# Patient Record
Sex: FEMALE
[De-identification: names, ages, dates, MRNs, and addresses within clinical notes are randomized per-mention and may not be internally consistent; named-entity substitution may affect disease eponyms.]

---

## 2018-02-11 PROBLEM — Z00.00 ENCOUNTER FOR PREVENTIVE HEALTH EXAMINATION: Status: ACTIVE | Noted: 2018-02-11

## 2018-03-13 ENCOUNTER — APPOINTMENT (OUTPATIENT)
Dept: ANTEPARTUM | Facility: CLINIC | Age: 33
End: 2018-03-13
Payer: COMMERCIAL

## 2018-03-13 ENCOUNTER — ASOB RESULT (OUTPATIENT)
Age: 33
End: 2018-03-13

## 2018-03-13 PROCEDURE — 76811 OB US DETAILED SNGL FETUS: CPT

## 2018-04-07 ENCOUNTER — TRANSCRIPTION ENCOUNTER (OUTPATIENT)
Age: 33
End: 2018-04-07

## 2018-05-22 ENCOUNTER — APPOINTMENT (OUTPATIENT)
Dept: ANTEPARTUM | Facility: CLINIC | Age: 33
End: 2018-05-22

## 2018-05-22 ENCOUNTER — ASOB RESULT (OUTPATIENT)
Age: 33
End: 2018-05-22

## 2018-05-22 ENCOUNTER — APPOINTMENT (OUTPATIENT)
Dept: ANTEPARTUM | Facility: CLINIC | Age: 33
End: 2018-05-22
Payer: COMMERCIAL

## 2018-05-22 PROCEDURE — 76819 FETAL BIOPHYS PROFIL W/O NST: CPT

## 2018-05-22 PROCEDURE — 99241 OFFICE CONSULTATION NEW/ESTAB PATIENT 15 MIN: CPT | Mod: 25

## 2018-05-22 PROCEDURE — 76816 OB US FOLLOW-UP PER FETUS: CPT

## 2018-07-06 ENCOUNTER — INPATIENT (INPATIENT)
Facility: HOSPITAL | Age: 33
LOS: 2 days | Discharge: HOME | End: 2018-07-09
Attending: OBSTETRICS & GYNECOLOGY | Admitting: OBSTETRICS & GYNECOLOGY

## 2018-07-06 ENCOUNTER — TRANSCRIPTION ENCOUNTER (OUTPATIENT)
Age: 33
End: 2018-07-06

## 2018-07-06 VITALS
TEMPERATURE: 98 F | DIASTOLIC BLOOD PRESSURE: 77 MMHG | RESPIRATION RATE: 17 BRPM | HEART RATE: 96 BPM | SYSTOLIC BLOOD PRESSURE: 132 MMHG | OXYGEN SATURATION: 97 %

## 2018-07-06 DIAGNOSIS — O26.899 OTHER SPECIFIED PREGNANCY RELATED CONDITIONS, UNSPECIFIED TRIMESTER: ICD-10-CM

## 2018-07-06 DIAGNOSIS — Z3A.00 WEEKS OF GESTATION OF PREGNANCY NOT SPECIFIED: ICD-10-CM

## 2018-07-06 LAB
BASOPHILS # BLD AUTO: 0.04 K/UL — SIGNIFICANT CHANGE UP (ref 0–0.2)
BASOPHILS NFR BLD AUTO: 0.3 % — SIGNIFICANT CHANGE UP (ref 0–2)
BLD GP AB SCN SERPL QL: NEGATIVE — SIGNIFICANT CHANGE UP
EOSINOPHIL # BLD AUTO: 0.08 K/UL — SIGNIFICANT CHANGE UP (ref 0–0.5)
EOSINOPHIL NFR BLD AUTO: 0.6 % — SIGNIFICANT CHANGE UP (ref 0–6)
HCT VFR BLD CALC: 48.2 % — HIGH (ref 34.5–45)
HGB BLD-MCNC: 15.5 G/DL — SIGNIFICANT CHANGE UP (ref 11.5–15.5)
IMM GRANULOCYTES # BLD AUTO: 0.09 # — SIGNIFICANT CHANGE UP
IMM GRANULOCYTES NFR BLD AUTO: 0.7 % — SIGNIFICANT CHANGE UP (ref 0–1.5)
LYMPHOCYTES # BLD AUTO: 15.4 % — SIGNIFICANT CHANGE UP (ref 13–44)
LYMPHOCYTES # BLD AUTO: 2.12 K/UL — SIGNIFICANT CHANGE UP (ref 1–3.3)
MCHC RBC-ENTMCNC: 27.9 PG — SIGNIFICANT CHANGE UP (ref 27–34)
MCHC RBC-ENTMCNC: 32.2 % — SIGNIFICANT CHANGE UP (ref 32–36)
MCV RBC AUTO: 86.7 FL — SIGNIFICANT CHANGE UP (ref 80–100)
MONOCYTES # BLD AUTO: 0.99 K/UL — HIGH (ref 0–0.9)
MONOCYTES NFR BLD AUTO: 7.2 % — SIGNIFICANT CHANGE UP (ref 2–14)
NEUTROPHILS # BLD AUTO: 10.44 K/UL — HIGH (ref 1.8–7.4)
NEUTROPHILS NFR BLD AUTO: 75.8 % — SIGNIFICANT CHANGE UP (ref 43–77)
NRBC # FLD: 0 — SIGNIFICANT CHANGE UP
PLATELET # BLD AUTO: 243 K/UL — SIGNIFICANT CHANGE UP (ref 150–400)
PMV BLD: 11.1 FL — SIGNIFICANT CHANGE UP (ref 7–13)
RBC # BLD: 5.56 M/UL — HIGH (ref 3.8–5.2)
RBC # FLD: 13.7 % — SIGNIFICANT CHANGE UP (ref 10.3–14.5)
RH IG SCN BLD-IMP: POSITIVE — SIGNIFICANT CHANGE UP
WBC # BLD: 13.76 K/UL — HIGH (ref 3.8–10.5)
WBC # FLD AUTO: 13.76 K/UL — HIGH (ref 3.8–10.5)

## 2018-07-06 RX ORDER — SODIUM CHLORIDE 9 MG/ML
1000 INJECTION, SOLUTION INTRAVENOUS
Qty: 0 | Refills: 0 | Status: DISCONTINUED | OUTPATIENT
Start: 2018-07-06 | End: 2018-07-07

## 2018-07-06 RX ORDER — SODIUM CHLORIDE 9 MG/ML
1000 INJECTION, SOLUTION INTRAVENOUS
Qty: 0 | Refills: 0 | Status: DISCONTINUED | OUTPATIENT
Start: 2018-07-06 | End: 2018-07-06

## 2018-07-06 RX ORDER — OXYTOCIN 10 UNIT/ML
333.33 VIAL (ML) INJECTION
Qty: 20 | Refills: 0 | Status: DISCONTINUED | OUTPATIENT
Start: 2018-07-06 | End: 2018-07-06

## 2018-07-06 RX ADMIN — SODIUM CHLORIDE 250 MILLILITER(S): 9 INJECTION, SOLUTION INTRAVENOUS at 12:43

## 2018-07-06 NOTE — PATIENT PROFILE OB - PROVIDER NOTIFICATION
----- Message from Sheela Savage sent at 4/2/2018 12:16 PM CDT -----  Contact: pt  Pt need a refill on her Losartan 25 mg send to [facility] and the pt says she is completely out.  LOV 9/29/17 Dr. augustin    Thanks  
Declines

## 2018-07-07 LAB
RH IG SCN BLD-IMP: POSITIVE — SIGNIFICANT CHANGE UP
T PALLIDUM AB TITR SER: NEGATIVE — SIGNIFICANT CHANGE UP

## 2018-07-07 RX ORDER — PRAMOXINE HYDROCHLORIDE 150 MG/15G
1 AEROSOL, FOAM RECTAL EVERY 4 HOURS
Qty: 0 | Refills: 0 | Status: DISCONTINUED | OUTPATIENT
Start: 2018-07-07 | End: 2018-07-09

## 2018-07-07 RX ORDER — SODIUM CHLORIDE 9 MG/ML
3 INJECTION INTRAMUSCULAR; INTRAVENOUS; SUBCUTANEOUS EVERY 8 HOURS
Qty: 0 | Refills: 0 | Status: DISCONTINUED | OUTPATIENT
Start: 2018-07-07 | End: 2018-07-09

## 2018-07-07 RX ORDER — SIMETHICONE 80 MG/1
80 TABLET, CHEWABLE ORAL EVERY 6 HOURS
Qty: 0 | Refills: 0 | Status: DISCONTINUED | OUTPATIENT
Start: 2018-07-07 | End: 2018-07-09

## 2018-07-07 RX ORDER — DIBUCAINE 1 %
1 OINTMENT (GRAM) RECTAL EVERY 4 HOURS
Qty: 0 | Refills: 0 | Status: DISCONTINUED | OUTPATIENT
Start: 2018-07-07 | End: 2018-07-09

## 2018-07-07 RX ORDER — KETOROLAC TROMETHAMINE 30 MG/ML
30 SYRINGE (ML) INJECTION ONCE
Qty: 0 | Refills: 0 | Status: DISCONTINUED | OUTPATIENT
Start: 2018-07-07 | End: 2018-07-07

## 2018-07-07 RX ORDER — PRAMOXINE HYDROCHLORIDE 150 MG/15G
1 AEROSOL, FOAM RECTAL EVERY 4 HOURS
Qty: 0 | Refills: 0 | Status: DISCONTINUED | OUTPATIENT
Start: 2018-07-07 | End: 2018-07-07

## 2018-07-07 RX ORDER — AER TRAVELER 0.5 G/1
1 SOLUTION RECTAL; TOPICAL EVERY 4 HOURS
Qty: 0 | Refills: 0 | Status: DISCONTINUED | OUTPATIENT
Start: 2018-07-07 | End: 2018-07-09

## 2018-07-07 RX ORDER — ACETAMINOPHEN 500 MG
975 TABLET ORAL EVERY 6 HOURS
Qty: 0 | Refills: 0 | Status: DISCONTINUED | OUTPATIENT
Start: 2018-07-07 | End: 2018-07-09

## 2018-07-07 RX ORDER — OXYCODONE HYDROCHLORIDE 5 MG/1
5 TABLET ORAL EVERY 4 HOURS
Qty: 0 | Refills: 0 | Status: DISCONTINUED | OUTPATIENT
Start: 2018-07-07 | End: 2018-07-09

## 2018-07-07 RX ORDER — TETANUS TOXOID, REDUCED DIPHTHERIA TOXOID AND ACELLULAR PERTUSSIS VACCINE, ADSORBED 5; 2.5; 8; 8; 2.5 [IU]/.5ML; [IU]/.5ML; UG/.5ML; UG/.5ML; UG/.5ML
0.5 SUSPENSION INTRAMUSCULAR ONCE
Qty: 0 | Refills: 0 | Status: DISCONTINUED | OUTPATIENT
Start: 2018-07-07 | End: 2018-07-09

## 2018-07-07 RX ORDER — ALBUTEROL 90 UG/1
2 AEROSOL, METERED ORAL EVERY 6 HOURS
Qty: 0 | Refills: 0 | Status: DISCONTINUED | OUTPATIENT
Start: 2018-07-07 | End: 2018-07-09

## 2018-07-07 RX ORDER — OXYCODONE HYDROCHLORIDE 5 MG/1
5 TABLET ORAL
Qty: 0 | Refills: 0 | Status: DISCONTINUED | OUTPATIENT
Start: 2018-07-07 | End: 2018-07-09

## 2018-07-07 RX ORDER — HYDROCORTISONE 1 %
1 OINTMENT (GRAM) TOPICAL EVERY 4 HOURS
Qty: 0 | Refills: 0 | Status: DISCONTINUED | OUTPATIENT
Start: 2018-07-07 | End: 2018-07-09

## 2018-07-07 RX ORDER — OXYCODONE AND ACETAMINOPHEN 5; 325 MG/1; MG/1
2 TABLET ORAL EVERY 6 HOURS
Qty: 0 | Refills: 0 | Status: DISCONTINUED | OUTPATIENT
Start: 2018-07-07 | End: 2018-07-07

## 2018-07-07 RX ORDER — MAGNESIUM HYDROXIDE 400 MG/1
30 TABLET, CHEWABLE ORAL
Qty: 0 | Refills: 0 | Status: DISCONTINUED | OUTPATIENT
Start: 2018-07-07 | End: 2018-07-09

## 2018-07-07 RX ORDER — ACETAMINOPHEN 500 MG
650 TABLET ORAL EVERY 6 HOURS
Qty: 0 | Refills: 0 | Status: DISCONTINUED | OUTPATIENT
Start: 2018-07-07 | End: 2018-07-07

## 2018-07-07 RX ORDER — HYDROCORTISONE 1 %
1 OINTMENT (GRAM) TOPICAL EVERY 4 HOURS
Qty: 0 | Refills: 0 | Status: DISCONTINUED | OUTPATIENT
Start: 2018-07-07 | End: 2018-07-07

## 2018-07-07 RX ORDER — GLYCERIN ADULT
1 SUPPOSITORY, RECTAL RECTAL AT BEDTIME
Qty: 0 | Refills: 0 | Status: DISCONTINUED | OUTPATIENT
Start: 2018-07-07 | End: 2018-07-09

## 2018-07-07 RX ORDER — LANOLIN
1 OINTMENT (GRAM) TOPICAL EVERY 6 HOURS
Qty: 0 | Refills: 0 | Status: DISCONTINUED | OUTPATIENT
Start: 2018-07-07 | End: 2018-07-09

## 2018-07-07 RX ORDER — ERGOCALCIFEROL 1.25 MG/1
0 CAPSULE ORAL
Qty: 0 | Refills: 0 | COMMUNITY

## 2018-07-07 RX ORDER — OXYTOCIN 10 UNIT/ML
41.67 VIAL (ML) INJECTION
Qty: 20 | Refills: 0 | Status: DISCONTINUED | OUTPATIENT
Start: 2018-07-07 | End: 2018-07-09

## 2018-07-07 RX ORDER — IBUPROFEN 200 MG
600 TABLET ORAL EVERY 6 HOURS
Qty: 0 | Refills: 0 | Status: DISCONTINUED | OUTPATIENT
Start: 2018-07-07 | End: 2018-07-09

## 2018-07-07 RX ORDER — DIPHENHYDRAMINE HCL 50 MG
25 CAPSULE ORAL EVERY 6 HOURS
Qty: 0 | Refills: 0 | Status: DISCONTINUED | OUTPATIENT
Start: 2018-07-07 | End: 2018-07-09

## 2018-07-07 RX ORDER — IBUPROFEN 200 MG
600 TABLET ORAL EVERY 6 HOURS
Qty: 0 | Refills: 0 | Status: DISCONTINUED | OUTPATIENT
Start: 2018-07-07 | End: 2018-07-07

## 2018-07-07 RX ORDER — DOCUSATE SODIUM 100 MG
100 CAPSULE ORAL
Qty: 0 | Refills: 0 | Status: DISCONTINUED | OUTPATIENT
Start: 2018-07-07 | End: 2018-07-09

## 2018-07-07 RX ADMIN — SODIUM CHLORIDE 3 MILLILITER(S): 9 INJECTION INTRAMUSCULAR; INTRAVENOUS; SUBCUTANEOUS at 14:23

## 2018-07-07 RX ADMIN — Medication 125 MILLIUNIT(S)/MIN: at 01:39

## 2018-07-07 RX ADMIN — SODIUM CHLORIDE 3 MILLILITER(S): 9 INJECTION INTRAMUSCULAR; INTRAVENOUS; SUBCUTANEOUS at 06:56

## 2018-07-07 NOTE — DISCHARGE NOTE OB - CARE PROVIDER_API CALL
Mayra Tripathi), [address]  [address] [place] [address]  Phone: [phone]  Fax: [phone]

## 2018-07-07 NOTE — DISCHARGE NOTE OB - CARE PLAN
Principal Discharge DX:	 (spontaneous vaginal delivery)  Goal:	Routine recovery  Assessment and plan of treatment:	Routine postop care

## 2018-07-07 NOTE — DISCHARGE NOTE OB - PATIENT PORTAL LINK FT
You can access the [facility] Patient Portal, offered by [facility], by registering with the following website: [email or web address]

## 2018-07-08 RX ADMIN — Medication 975 MILLIGRAM(S): at 23:44

## 2018-07-08 RX ADMIN — Medication 1 TABLET(S): at 08:07

## 2018-07-08 RX ADMIN — Medication 975 MILLIGRAM(S): at 08:56

## 2018-07-08 RX ADMIN — SODIUM CHLORIDE 3 MILLILITER(S): 9 INJECTION INTRAMUSCULAR; INTRAVENOUS; SUBCUTANEOUS at 02:52

## 2018-07-08 RX ADMIN — Medication 975 MILLIGRAM(S): at 08:07

## 2018-07-08 NOTE — PROGRESS NOTE ADULT - SUBJECTIVE AND OBJECTIVE BOX
INTERVAL HPI/OVERNIGHT EVENTS: Pt seen and examined at bedside.  Doing well, mild uterine cramping. +voiding without difficulty, +ambulation, roger reg diet.    MEDICATIONS  (STANDING):  acetaminophen   Tablet. 975 milliGRAM(s) Oral every 6 hours  diphtheria/tetanus/pertussis (acellular) Vaccine (ADAcel) 0.5 milliLiter(s) IntraMuscular once  ibuprofen  Tablet 600 milliGRAM(s) Oral every 6 hours  oxyCODONE    IR 5 milliGRAM(s) Oral every 3 hours  oxytocin Infusion 41.667 milliUNIT(s)/Min (125 mL/Hr) IV Continuous <Continuous>  prenatal multivitamin 1 Tablet(s) Oral daily  sodium chloride 0.9% lock flush 3 milliLiter(s) IV Push every 8 hours    MEDICATIONS  (PRN):  ALBUTerol    90 MICROgram(s) HFA Inhaler 2 Puff(s) Inhalation every 6 hours PRN Shortness of Breath and/or Wheezing  dibucaine 1% Ointment 1 Application(s) Topical every 4 hours PRN Perineal Discomfort  diphenhydrAMINE   Capsule 25 milliGRAM(s) Oral every 6 hours PRN Itching  docusate sodium 100 milliGRAM(s) Oral two times a day PRN Stool Softening  glycerin Suppository - Adult 1 Suppository(s) Rectal at bedtime PRN Constipation  hydrocortisone 1% Cream 1 Application(s) Topical every 4 hours PRN perineal discomfort  lanolin Ointment 1 Application(s) Topical every 6 hours PRN Sore Nipples  magnesium hydroxide Suspension 30 milliLiter(s) Oral two times a day PRN Constipation  oxyCODONE    IR 5 milliGRAM(s) Oral every 4 hours PRN Severe Pain (7 -10)  pramoxine 1%/zinc 5% Cream 1 Application(s) Topical every 4 hours PRN perineal discomfort  simethicone 80 milliGRAM(s) Chew every 6 hours PRN Gas  witch hazel Pads 1 Application(s) Topical every 4 hours PRN Perineal Discomfort      Vital Signs Last 24 Hrs  T(C): 37 (08 Jul 2018 17:19), Max: 37 (08 Jul 2018 17:19)  T(F): 98.6 (08 Jul 2018 17:19), Max: 98.6 (08 Jul 2018 17:19)  HR: 97 (08 Jul 2018 17:19) (75 - 97)  BP: 124/77 (08 Jul 2018 17:19) (115/65 - 124/77)  BP(mean): --  RR: 18 (08 Jul 2018 17:19) (18 - 19)  SpO2: 100% (08 Jul 2018 17:19) (98% - 100%)    PHYSICAL EXAM:    GA: NAD, A+0 x 3  Abd: ( + ) BS, soft, nontender, nondistended, no rebound or guarding,   Uterus: Fundus firm    LABS:                  RADIOLOGY & ADDITIONAL TESTS:

## 2018-07-08 NOTE — PROVIDER CONTACT NOTE (OTHER) - ACTION/TREATMENT ORDERED:
Patient urinated. Fundus reassessed after toileting, no change. Lochia light. Patient educated to alert staff if increased perineal bleeding noted or if perineal blood clots noted.

## 2018-07-08 NOTE — PROGRESS NOTE ADULT - SUBJECTIVE AND OBJECTIVE BOX
Anesthesia Post-op Note    POD#1 S/P labor analgesia    Patient is doing well.  OOBAA. Tolerating clears.  Pain is tolerable.  No residual anesthetic issues or complications noted.    Vital Signs Last 24 Hrs  T(C): 36.6 (08 Jul 2018 05:59), Max: 36.7 (07 Jul 2018 20:02)  T(F): 97.9 (08 Jul 2018 05:59), Max: 98.1 (07 Jul 2018 20:02)  HR: 75 (08 Jul 2018 05:59) (75 - 82)  BP: 115/65 (08 Jul 2018 05:59) (115/65 - 125/73)  BP(mean): --  RR: 19 (08 Jul 2018 05:59) (19 - 19)  SpO2: 98% (08 Jul 2018 05:59) (98% - 100%)

## 2018-07-09 VITALS
SYSTOLIC BLOOD PRESSURE: 121 MMHG | OXYGEN SATURATION: 100 % | HEART RATE: 72 BPM | RESPIRATION RATE: 18 BRPM | DIASTOLIC BLOOD PRESSURE: 81 MMHG | TEMPERATURE: 98 F

## 2018-07-09 RX ORDER — IBUPROFEN 200 MG
1 TABLET ORAL
Qty: 0 | Refills: 0 | COMMUNITY
Start: 2018-07-09

## 2018-07-09 RX ORDER — ACETAMINOPHEN 500 MG
3 TABLET ORAL
Qty: 0 | Refills: 0 | COMMUNITY
Start: 2018-07-09

## 2018-07-09 RX ADMIN — Medication 975 MILLIGRAM(S): at 06:16

## 2018-07-09 RX ADMIN — Medication 975 MILLIGRAM(S): at 00:30

## 2018-07-13 LAB — GLUCOSE BLDC GLUCOMTR-MCNC: 290 MG/DL — HIGH (ref 70–99)

## 2019-10-25 NOTE — PATIENT PROFILE OB - POST PARTUM DEPRESSION SCREEN OB 4
Phone Numbers: [phone] (H)  Preferred Language:  English [1]    [email or web address]    Payor: [employer] MEDICARE COMPLETE / Plan: MEDICARE COMPLETE HMO / Product Type: MEDADV   Specialty Referral Req'd?:  N/A  Secondary?   No  Work Comp?:  No    Blayne Thomason MD    [facility] Internal Medicine  Phone: [phone]  Fax: [phone]    Estimated body mass index is 28.46 kg/m² as calculated from the following:    Height as of 10/24/19: 6' (1.829 m).    Weight as of 10/24/19: 95.2 kg.    Scheduling at [facility]?:  YES - CHECK BMI Limit BMI OK  and  Confirm if Pacemaker...No - OK to sched at [facility]    PreOp Diagnoses:    Oth tear of medial meniscus, current injury, left knee, subs           no

## 2022-08-03 NOTE — PATIENT PROFILE OB - FALL HARM RISK CONCLUSION
Writer called Any to discuss. Pt states that she been having severe anxiety and chest pain. She has gone to the ER twice and they tell her she's having panic attacks. She reports that they ruled out heart attack or other medical causes. Her last ER visit was Sunday, July 24th. Pt states, \"I keep having these attacks, I'm taking Geodon but I don't know if I want to increase it.\" Pt would not elaborate as to why she was hesitant to increase, stated \"I don't know\" when writer asked. Pt also states, \" I'm having Bipolar issues too, insomnia, racing thoughts, anxiety, chest pain, sudden urge to just go go go. I will have tons of energy then I'll crash and pass out. When I take the anxiety medication Coco gives me it knocks me out when I take it for a panic attack.\" Pt states she cannot take Hydroxyzine during the day for a panic attack because it is too sedating and effects her ability to work. Routed to provider.    Universal Safety Interventions

## 2024-08-29 ENCOUNTER — NON-APPOINTMENT (OUTPATIENT)
Age: 39
End: 2024-08-29

## 2024-08-30 ENCOUNTER — LABORATORY RESULT (OUTPATIENT)
Age: 39
End: 2024-08-30

## 2024-08-30 ENCOUNTER — APPOINTMENT (OUTPATIENT)
Dept: GASTROENTEROLOGY | Facility: CLINIC | Age: 39
End: 2024-08-30
Payer: COMMERCIAL

## 2024-08-30 VITALS
WEIGHT: 164 LBS | SYSTOLIC BLOOD PRESSURE: 126 MMHG | TEMPERATURE: 98.2 F | RESPIRATION RATE: 14 BRPM | HEART RATE: 89 BPM | BODY MASS INDEX: 32.2 KG/M2 | HEIGHT: 60 IN | DIASTOLIC BLOOD PRESSURE: 80 MMHG | OXYGEN SATURATION: 99 %

## 2024-08-30 DIAGNOSIS — K76.0 FATTY (CHANGE OF) LIVER, NOT ELSEWHERE CLASSIFIED: ICD-10-CM

## 2024-08-30 DIAGNOSIS — Z78.9 OTHER SPECIFIED HEALTH STATUS: ICD-10-CM

## 2024-08-30 DIAGNOSIS — R74.8 ABNORMAL LEVELS OF OTHER SERUM ENZYMES: ICD-10-CM

## 2024-08-30 DIAGNOSIS — Z86.19 PERSONAL HISTORY OF OTHER INFECTIOUS AND PARASITIC DISEASES: ICD-10-CM

## 2024-08-30 DIAGNOSIS — E66.3 OVERWEIGHT: ICD-10-CM

## 2024-08-30 PROCEDURE — 36415 COLL VENOUS BLD VENIPUNCTURE: CPT

## 2024-08-30 PROCEDURE — 99204 OFFICE O/P NEW MOD 45 MIN: CPT

## 2024-08-30 RX ORDER — NORETHINDRONE 0.35 MG/1
0.35 TABLET ORAL
Refills: 0 | Status: ACTIVE | COMMUNITY

## 2024-08-30 NOTE — ASSESSMENT
[FreeTextEntry1] : 38-year-old female born to the [place] with previous history of treated hepatitis C approximately 8 years ago.  She also states she is known to have "fatty liver".  The previous treating doctor was Dr.Biju Lozano.  Her BMI is 32.  There is no family history of liver disease.  There is no EtOH usage.  She feels well and has a 6-year-old daughter.  She had a previous FibroScan the results of which we do not have nor any recent laboratory testing.  IMP: 1.tolbert fibrosure 2. Sonogram of abdomen; elective fibroscan 3. CBC, CMP, a1c, Lipid, Iron studies, TSH, hbsag, hcv rna, hav 4. RTO 3months 5. I spoke with the patient at length regarding fatty liver disease (Hepatic Steatosis). I have reviewed the spectrum of disease (NAFDL and nonalcoholic steatohepatitis or TOLBERT)  as well as the risk of disease progression to  cirrhosis and its complications. I have also explained that patients with fatty liver disease may develop liver cancer without cirrhosis and therefore should be screened yearly with an abdominal ultrasound. I have explained that fatty liver disease is commonly seen in patients with diabetes or those who are overweight. I have explained that fatty liver disease may also be a precursor to the development of diabetes as it is part of the metabolic syndrome. We reviewed the treatment of fatty liver disease and explained that the best therapy is diet and exercise. The diet should be a "healthy heart diet, " low in fatty foods or the Mediterranean diet. Alcohol should  be avoided. Furthermore, I explained that weight loss, 5-10% of body weight, may lead to improvement in the underlying liver disease state.

## 2024-08-30 NOTE — PHYSICAL EXAM
[Alert] : alert [Normal Voice/Communication] : normal voice/communication [No Acute Distress] : no acute distress [Obese (BMI >= 30)] : obese (BMI >= 30) [Sclera] : the sclera and conjunctiva were normal [Hearing Threshold Finger Rub Not [place]] : hearing was normal [Normal Lips/Gums] : the lips and gums were normal [Oropharynx] : the oropharynx was normal [Normal Appearance] : the appearance of the neck was normal [No Neck Mass] : no neck mass was observed [No Respiratory Distress] : no respiratory distress [No Acc Muscle Use] : no accessory muscle use [Respiration, Rhythm And Depth] : normal respiratory rhythm and effort [Auscultation Breath Sounds / Voice Sounds] : lungs were clear to auscultation bilaterally [Heart Rate And Rhythm] : heart rate was normal and rhythm regular [Normal S1, S2] : normal S1 and S2 [Murmurs] : no murmurs [Bowel Sounds] : normal bowel sounds [Abdomen Tenderness] : non-tender [No Masses] : no abdominal mass palpated [Abdomen Soft] : soft [] : no hepatosplenomegaly [Oriented To Time, Place, And Person] : oriented to person, place, and time

## 2024-08-30 NOTE — HISTORY OF PRESENT ILLNESS
[FreeTextEntry1] : 38-year-old female born to the [place] with previous history of treated hepatitis C approximately 8 years ago.  She also states she is known to have "fatty liver".  The previous treating doctor was Dr.Biju Lozano.  Her BMI is 32.  There is no family history of liver disease.  There is no EtOH usage.  She feels well and has a 6-year-old daughter.  She had a previous FibroScan the results of which we do not have nor any recent laboratory testing.

## 2024-08-31 ENCOUNTER — TRANSCRIPTION ENCOUNTER (OUTPATIENT)
Age: 39
End: 2024-08-31

## 2024-09-02 ENCOUNTER — TRANSCRIPTION ENCOUNTER (OUTPATIENT)
Age: 39
End: 2024-09-02

## 2024-09-02 LAB
BASOPHILS # BLD AUTO: 0.07 K/UL
BASOPHILS NFR BLD AUTO: 0.6 %
CELIACPAN: NORMAL
CHOLEST SERPL-MCNC: 230 MG/DL
EOSINOPHIL # BLD AUTO: 0.32 K/UL
EOSINOPHIL NFR BLD AUTO: 2.7 %
ESTIMATED AVERAGE GLUCOSE: 114 MG/DL
FERRITIN SERPL-MCNC: 138 NG/ML
HBA1C MFR BLD HPLC: 5.6 %
HBV SURFACE AB SER QL: NONREACTIVE
HBV SURFACE AG SER QL: NONREACTIVE
HCT VFR BLD CALC: 48 %
HCV AB SER QL: REACTIVE
HCV RNA SERPL NAA+PROBE-LOG IU: NOT DETECTED LOGIU/ML
HCV S/CO RATIO: 11.11 S/CO
HDLC SERPL-MCNC: 44 MG/DL
HEPATITIS A IGG ANTIBODY: REACTIVE
HEPC RNA INTERP: NOT DETECTED
HGB BLD-MCNC: 15.2 G/DL
IMM GRANULOCYTES NFR BLD AUTO: 0.3 %
IRON SATN MFR SERPL: 35 %
IRON SERPL-MCNC: 122 UG/DL
LDLC SERPL CALC-MCNC: 153 MG/DL
LYMPHOCYTES # BLD AUTO: 2.76 K/UL
LYMPHOCYTES NFR BLD AUTO: 23.6 %
MAN DIFF?: NORMAL
MCHC RBC-ENTMCNC: 28.4 PG
MCHC RBC-ENTMCNC: 31.7 GM/DL
MCV RBC AUTO: 89.6 FL
MONOCYTES # BLD AUTO: 0.79 K/UL
MONOCYTES NFR BLD AUTO: 6.8 %
NEUTROPHILS # BLD AUTO: 7.72 K/UL
NEUTROPHILS NFR BLD AUTO: 66 %
NONHDLC SERPL-MCNC: 186 MG/DL
PLATELET # BLD AUTO: 342 K/UL
RBC # BLD: 5.36 M/UL
RBC # FLD: 12.9 %
TIBC SERPL-MCNC: 353 UG/DL
TRIGL SERPL-MCNC: 179 MG/DL
TSH SERPL-ACNC: 1.01 UIU/ML
UIBC SERPL-MCNC: 231 UG/DL
WBC # FLD AUTO: 11.69 K/UL

## 2024-09-03 ENCOUNTER — TRANSCRIPTION ENCOUNTER (OUTPATIENT)
Age: 39
End: 2024-09-03

## 2024-11-29 ENCOUNTER — TRANSCRIPTION ENCOUNTER (OUTPATIENT)
Age: 39
End: 2024-11-29

## 2024-12-02 ENCOUNTER — TRANSCRIPTION ENCOUNTER (OUTPATIENT)
Age: 39
End: 2024-12-02

## 2024-12-06 ENCOUNTER — APPOINTMENT (OUTPATIENT)
Dept: GASTROENTEROLOGY | Facility: CLINIC | Age: 39
End: 2024-12-06
Payer: COMMERCIAL

## 2024-12-06 VITALS
WEIGHT: 165.13 LBS | OXYGEN SATURATION: 97 % | HEART RATE: 81 BPM | TEMPERATURE: 97 F | DIASTOLIC BLOOD PRESSURE: 80 MMHG | HEIGHT: 60 IN | SYSTOLIC BLOOD PRESSURE: 130 MMHG | BODY MASS INDEX: 32.42 KG/M2 | RESPIRATION RATE: 14 BRPM

## 2024-12-06 DIAGNOSIS — R74.8 ABNORMAL LEVELS OF OTHER SERUM ENZYMES: ICD-10-CM

## 2024-12-06 DIAGNOSIS — Z86.39 PERSONAL HISTORY OF OTHER ENDOCRINE, NUTRITIONAL AND METABOLIC DISEASE: ICD-10-CM

## 2024-12-06 DIAGNOSIS — E66.3 OVERWEIGHT: ICD-10-CM

## 2024-12-06 DIAGNOSIS — K76.0 FATTY (CHANGE OF) LIVER, NOT ELSEWHERE CLASSIFIED: ICD-10-CM

## 2024-12-06 PROCEDURE — 99213 OFFICE O/P EST LOW 20 MIN: CPT

## 2024-12-06 PROCEDURE — 36415 COLL VENOUS BLD VENIPUNCTURE: CPT

## 2024-12-06 NOTE — HISTORY OF PRESENT ILLNESS
[FreeTextEntry1] : 39-year-old female born to the [place] with previous history of treated hepatitis C approximately 8 years ago.  She also states she is known to have "fatty liver".  The previous treating doctor was Dr.Biju Vickers.  Her BMI is 32.  There is no family history of liver disease.  There is no EtOH usage.  She feels well and has a 6-year-old daughter.  She had a previous FibroScan the results of which we do not have nor any recent laboratory testing.   RTO 12/6/24- present fibroscan with S1 F0  (, 5.3 KP) 2020. Reviewed lipid profile with hld 44, elevated LDL and Total cholesterol. Sonogram at [facility] with steatosis, no lesions. Discussed need for followup Fibroscan, repeat OVERTON fibrosure. Understands need for weight loss, exercise with BMI 32 with comorbidities. Briefly discussed usage of GLP1 agonists both for MASH and weight loss.

## 2024-12-06 NOTE — ASSESSMENT
[FreeTextEntry1] : 39-year-old female born to the [place] with previous history of treated hepatitis C approximately 8 years ago.  She also states she is known to have "fatty liver".  The previous treating doctor was Dr.Biju Vickers.  Her BMI is 32.  There is no family history of liver disease.  There is no EtOH usage.  She feels well and has a 6-year-old daughter.  She had a previous FibroScan the results of which we do not have nor any recent laboratory testing.   RTO 12/6/24- present fibroscan with S1 F0  (, 5.3 KP) 2020. Reviewed lipid profile with hld 44, elevated LDL and Total cholesterol. Sonogram at [facility] with steatosis, no lesions. Discussed need for followup Fibroscan, repeat OVERTON fibrosure. Understands need for weight loss, exercise with BMI 32 with comorbidities. Briefly discussed usage of GLP1 agonists both for MASH and weight loss.  1.overton fibrosure 2. Fibroscan 3.weight loss advised 5-10 % I spoke with the patient at length regarding fatty liver disease (Hepatic Steatosis). I have reviewed the spectrum of disease (NAFDL and nonalcoholic steatohepatitis or OVERTON)  as well as the risk of disease progression to  cirrhosis and its complications. I have also explained that patients with fatty liver disease may develop liver cancer without cirrhosis and therefore should be screened yearly with an abdominal ultrasound. I have explained that fatty liver disease is commonly seen in patients with diabetes or those who are overweight. I have explained that fatty liver disease may also be a precursor to the development of diabetes as it is part of the metabolic syndrome. We reviewed the treatment of fatty liver disease and explained that the best therapy is diet and exercise. The diet should be a "healthy heart diet, " low in fatty foods or the Mediterranean diet. Alcohol should  be avoided. Furthermore, I explained that weight loss, 5-10% of body weight, may lead to improvement in the underlying liver disease state. 4. RTO 3months

## 2024-12-06 NOTE — PHYSICAL EXAM
[Alert] : alert [Normal Voice/Communication] : normal voice/communication [No Acute Distress] : no acute distress [Obese (BMI >= 30)] : obese (BMI >= 30) [Sclera] : the sclera and conjunctiva were normal [Hearing Threshold Finger Rub Not [place]] : hearing was normal [Normal Lips/Gums] : the lips and gums were normal [Oropharynx] : the oropharynx was normal [Normal Appearance] : the appearance of the neck was normal [No Neck Mass] : no neck mass was observed [No Respiratory Distress] : no respiratory distress [No Acc Muscle Use] : no accessory muscle use [Respiration, Rhythm And Depth] : normal respiratory rhythm and effort [Auscultation Breath Sounds / Voice Sounds] : lungs were clear to auscultation bilaterally [Heart Rate And Rhythm] : heart rate was normal and rhythm regular [Normal S1, S2] : normal S1 and S2 [Murmurs] : no murmurs [Bowel Sounds] : normal bowel sounds [Abdomen Tenderness] : non-tender [No Masses] : no abdominal mass palpated [Abdomen Soft] : soft [] : no hepatosplenomegaly [Oriented To Time, Place, And Person] : oriented to person, place, and time

## 2024-12-11 LAB
AST SERPL W P-5'-P-CCNC: 31 IU/L
CHOLEST SERPL-MCNC: 243 MG/DL
COMMENT:: NORMAL
FIBROSIS STAGE SERPL QL: NORMAL
FIBROSURE ALPHA 2 MACROGLOBULINS: 276 MG/DL
FIBROSURE ALT (SGPT): 49 IU/L
FIBROSURE APOLIPOPROTEIN A1: 142 MG/DL
FIBROSURE GGT: 25 IU/L
FIBROSURE HAPTOGLOBIN: 166 MG/DL
FIBROSURE SCORING: NORMAL
FIBROSURE TOTAL BILIRUBIN: 0.4 MG/DL
GLUCOSE SERPL-MCNC: 88 MG/DL
INTERPRETATIONS:: NORMAL
LIVER FIBR SCORE SERPL CALC.FIBROSURE: 0.18
Lab: NORMAL
NASH SCORING: NORMAL
NECROINFLAMMATORY ACT GRADE SERPL QL: NORMAL
NECROINFLAMMATORY ACT SCORE SERPL: 0
SERVICE CMNT-IMP: NORMAL
STEATOSIS GRADE: NORMAL
STEATOSIS SCORE: 0.4
STEATOSIS SCORING: NORMAL
TRIGL SERPL-MCNC: 132 MG/DL